# Patient Record
Sex: MALE | Race: WHITE | Employment: FULL TIME | URBAN - METROPOLITAN AREA
[De-identification: names, ages, dates, MRNs, and addresses within clinical notes are randomized per-mention and may not be internally consistent; named-entity substitution may affect disease eponyms.]

---

## 2019-04-22 ENCOUNTER — HOSPITAL ENCOUNTER (EMERGENCY)
Facility: CLINIC | Age: 24
Discharge: HOME | End: 2019-04-22
Attending: EMERGENCY MEDICINE | Admitting: EMERGENCY MEDICINE
Payer: OTHER MISCELLANEOUS

## 2019-04-22 VITALS
DIASTOLIC BLOOD PRESSURE: 86 MMHG | TEMPERATURE: 97.5 F | SYSTOLIC BLOOD PRESSURE: 150 MMHG | OXYGEN SATURATION: 98 % | HEART RATE: 83 BPM | RESPIRATION RATE: 16 BRPM

## 2019-04-22 DIAGNOSIS — S61.215A LACERATION OF LEFT RING FINGER WITHOUT FOREIGN BODY WITHOUT DAMAGE TO NAIL, INITIAL ENCOUNTER: ICD-10-CM

## 2019-04-22 PROCEDURE — 90715 TDAP VACCINE 7 YRS/> IM: CPT | Performed by: EMERGENCY MEDICINE

## 2019-04-22 PROCEDURE — 12001 RPR S/N/AX/GEN/TRNK 2.5CM/<: CPT

## 2019-04-22 PROCEDURE — 99283 EMERGENCY DEPT VISIT LOW MDM: CPT | Mod: 25

## 2019-04-22 PROCEDURE — 90471 IMMUNIZATION ADMIN: CPT

## 2019-04-22 PROCEDURE — 25000128 H RX IP 250 OP 636: Performed by: EMERGENCY MEDICINE

## 2019-04-22 RX ADMIN — CLOSTRIDIUM TETANI TOXOID ANTIGEN (FORMALDEHYDE INACTIVATED), CORYNEBACTERIUM DIPHTHERIAE TOXOID ANTIGEN (FORMALDEHYDE INACTIVATED), BORDETELLA PERTUSSIS TOXOID ANTIGEN (GLUTARALDEHYDE INACTIVATED), BORDETELLA PERTUSSIS FILAMENTOUS HEMAGGLUTININ ANTIGEN (FORMALDEHYDE INACTIVATED), BORDETELLA PERTUSSIS PERTACTIN ANTIGEN, AND BORDETELLA PERTUSSIS FIMBRIAE 2/3 ANTIGEN 0.5 ML: 5; 2; 2.5; 5; 3; 5 INJECTION, SUSPENSION INTRAMUSCULAR at 09:57

## 2019-04-22 ASSESSMENT — ENCOUNTER SYMPTOMS
NUMBNESS: 0
WOUND: 1

## 2019-04-22 NOTE — ED AVS SNAPSHOT
[facility] Emergency Department  [address]  [facility] [address]  Phone:  [phone]  Fax:  [phone]                                    Kareem Peace   MRN: [id number]    Department:  [facility] Emergency Department   Date of Visit:  4/22/2019           After Visit Summary Signature Page    I have received my discharge instructions, and my questions have been answered. I have discussed any challenges I see with this plan with the nurse or doctor.    ..........................................................................................................................................  Patient/Patient Representative Signature      ..........................................................................................................................................  Patient Representative Print Name and Relationship to Patient    ..................................................               ................................................  Date                                   Time    ..........................................................................................................................................  Reviewed by Signature/Title    ...................................................              ..............................................  Date                                               Time          22EPIC Rev 08/18

## 2019-04-22 NOTE — ED PROVIDER NOTES
History     Chief Complaint:  Laceration    HPI   Kareem Peace is a 24 year old male who presents for evaluation of a laceration. About 1 hour prior to presenting, he reports slicing his left index finger with a razor blade at work. Due to the mechanism or injury and amount of bleeding, he presented to the ED for evaluation. Here, he denies any numbness to the finger. He also denies any personal history of diabetes or family history of bleeding disorders. He does report a family history of diabetes. Of note, he does not know when his last tetanus shot was; per chart review, he received one in 2007.     Allergies:  NKDA    Medications:    The patient is currently on no regular medications.    Past Medical History:    The patient denies any significant past medical history.    Past Surgical History:    The patient does not have any pertinent past surgical history.    Family History:    No past pertinent family history.    Social History:  Injury happened at work  Works at [employer]    Review of Systems   Skin: Positive for wound.   Neurological: Negative for numbness.   All other systems reviewed and are negative.    Physical Exam     Patient Vitals for the past 24 hrs:   BP Temp Temp src Pulse Resp SpO2   04/22/19 0904 150/86 97.5  F (36.4  C) Temporal 83 16 98 %        Physical Exam  HEENT:  mmm  Eyes: PERRL B/L  Neck: Supple  CV: Peripheral pulses in tact and regular  Resp: Speaking in full sentences without any respiratory distress  Abd: Non distended  Ext:  Left Hand    Can oppose thumb.  No nail injury noted  He is able to fire Hand/finger flexors and extensors.  Sensation and perfusion intact throughout hand    Remainder of the skeletal survey is unremarkable    Skin: warm dry well perfused. 2 cm laceration on the fingerpad of finger 4  Neuro: Alert, no gross motor or sensory deficits      Emergency Department Course   Procedures:     Laceration Repair      LACERATION:  A simple clean 2 cm  laceration.    LOCATION:  Left index finger.    FUNCTION:  Distally sensation and circulation are intact.    ANESTHESIA:  Digital block using 0.5% Marcaine total of 3 mLs.    PREPARATION:  Irrigation and Scrubbing with Normal Saline.    DEBRIDEMENT:  no debridement.    CLOSURE:  Wound was closed with One Layer.  Skin closed with 6 x 5.0 Ethilon using interrupted sutures..    Interventions:  0957 Tdap, 0.5 mL, IM injection    Emergency Department Course:  Nursing notes and vitals reviewed.   (2816) I performed an exam of the patient as documented above.      Medicine administered as documented above.      (1007) I rechecked the patient and performed a laceration repair, as noted above. There were no complications of the procedure.     Findings and plan explained to the Patient. Patient discharged home with instructions regarding supportive care, medications, and reasons to return. The importance of close follow-up was reviewed.      I personally answered all related questions prior to discharge.       Impression & Plan      Medical Decision Making:  Kareem Peace is a 24 year old male presented to the Emergency Department with a laceration.  Given the time of the injury, the wound was felt amenable to primary closure.  After adequate anesthesia was obtained, the wound was thoroughly irrigated and examined.  There is no evidence of muscular, tendon, or bony involvement at this time, nor signs or symptoms of neurovascular compromise.  Additionally, given the mechanism of injury and examination, suspicion for a foreign body was low.    We discussed appropriate wound care instructions including keeping the wound dry for the first 24-48 hours, followed be gentle cleansing with soap and water.  Discussed appropriate wound care. Will plan for suture/staple removal in 7 days.  Patient was encouraged to return to the ER or follow-up with PCP in the meantime should any new or troubling symptoms develop. Anticipatory  guidance given prior to discharge.      Diagnosis:    ICD-10-CM    1. Laceration of left ring finger without foreign body without damage to nail, initial encounter S61.215A        Disposition:  discharged to home    Discharge Medications:  There were no discharge medications.     Scribe Disclosure:  I, Maria Luisa Nunez, am serving as a scribe on 4/22/2019 at 9:19 AM to personally document services performed by William Givens DO based on my observations and the provider's statements to me.     Maria Luisa Nunez  4/22/2019   [facility] EMERGENCY DEPARTMENT       William Givens DO  04/22/19 1104

## 2019-04-22 NOTE — ED TRIAGE NOTES
Presents to the ED with a laceration to the left 4th finger. States cut with a razor blade at work today. Unknown last tetanus.

## (undated) RX ORDER — BUPIVACAINE HYDROCHLORIDE 5 MG/ML
INJECTION, SOLUTION PERINEURAL
Status: DISPENSED
Start: 2019-04-22